# Patient Record
(demographics unavailable — no encounter records)

---

## 2024-10-23 NOTE — DISCUSSION/SUMMARY
[Normal Growth] : growth [Normal Development] : development [None] : No known medical problems [No Elimination Concerns] : elimination [No Feeding Concerns] : feeding [No Skin Concerns] : skin [Normal Sleep Pattern] : sleep [School] : school [Development and Mental Health] : development and mental health [Nutrition and Physical Activity] : nutrition and physical activity [Oral Health] : oral health [Safety] : safety [No Medications] : ~He/She~ is not on any medications [Patient] : patient [Full Activity without restrictions including Physical Education & Athletics] : Full Activity without restrictions including Physical Education & Athletics [FreeTextEntry1] : Discussed patient's growth and development. Discussed afterschool activities. Reviewed immunizations. Forms filled out for school. Return to office in one year or p.r.n.. Parent understand the plan

## 2024-10-23 NOTE — HISTORY OF PRESENT ILLNESS
[Mother] : mother [Normal] : Normal [Yes] : Patient goes to dentist yearly [Vitamin] : Primary Fluoride Source: Vitamin [Appropiate parent-child-sibling interaction] : appropriate parent-child-sibling interaction [Has Friends] : has friends [Grade ___] : Grade [unfilled] [Adequate social interactions] : adequate social interactions [Adequate behavior] : adequate behavior [Adequate performance] : adequate performance [Adequate attention] : adequate attention [No difficulties with Homework] : no difficulties with homework [No] : No cigarette smoke exposure [Appropriately restrained in motor vehicle] : appropriately restrained in motor vehicle [FreeTextEntry7] : Patient has been well [de-identified] : Regular diet

## 2024-12-28 NOTE — DISCUSSION/SUMMARY
[FreeTextEntry1] : Anticipatory guidance and parent education given.  Take antibiotic as prescribed- mom reports allergy to PCN Provide Tylenol/ Motrin as needed for pain or fever.  Follow up for worsening or new symptoms, questions, concerns.  Parent understanding of plan.

## 2024-12-28 NOTE — HISTORY OF PRESENT ILLNESS
[de-identified] : congestion, ear pain [FreeTextEntry6] : BIB mother for runny/stuffy nose x2-3 days. Bilateral ear pain overnight. No fever. No difficulty breathing. No v/d. No rash. No fatigue. Good po/uop/bm. Normal sleep and activity.